# Patient Record
Sex: MALE | Race: WHITE | NOT HISPANIC OR LATINO | Employment: FULL TIME | URBAN - METROPOLITAN AREA
[De-identification: names, ages, dates, MRNs, and addresses within clinical notes are randomized per-mention and may not be internally consistent; named-entity substitution may affect disease eponyms.]

---

## 2025-08-07 ENCOUNTER — APPOINTMENT (OUTPATIENT)
Dept: PRIMARY CARE | Facility: CLINIC | Age: 25
End: 2025-08-07
Payer: COMMERCIAL

## 2025-08-07 VITALS
SYSTOLIC BLOOD PRESSURE: 125 MMHG | HEIGHT: 72 IN | OXYGEN SATURATION: 100 % | BODY MASS INDEX: 40.88 KG/M2 | HEART RATE: 78 BPM | RESPIRATION RATE: 20 BRPM | TEMPERATURE: 97.5 F | WEIGHT: 301.8 LBS | DIASTOLIC BLOOD PRESSURE: 88 MMHG

## 2025-08-07 DIAGNOSIS — E78.2 MIXED HYPERLIPIDEMIA: ICD-10-CM

## 2025-08-07 DIAGNOSIS — R03.0 ELEVATED BP WITHOUT DIAGNOSIS OF HYPERTENSION: ICD-10-CM

## 2025-08-07 DIAGNOSIS — G89.29 CHRONIC LLQ PAIN: Primary | ICD-10-CM

## 2025-08-07 DIAGNOSIS — K57.90 DIVERTICULOSIS: ICD-10-CM

## 2025-08-07 DIAGNOSIS — R10.32 CHRONIC LLQ PAIN: Primary | ICD-10-CM

## 2025-08-07 PROBLEM — R63.5 ABNORMAL WEIGHT GAIN: Status: ACTIVE | Noted: 2025-08-07

## 2025-08-07 PROBLEM — R45.89 DEPRESSED MOOD: Status: ACTIVE | Noted: 2025-08-07

## 2025-08-07 PROBLEM — F32.A DEPRESSION: Status: ACTIVE | Noted: 2025-08-07

## 2025-08-07 PROBLEM — J02.9 SORE THROAT: Status: ACTIVE | Noted: 2025-08-07

## 2025-08-07 PROBLEM — E66.9 OBESE: Status: ACTIVE | Noted: 2025-08-07

## 2025-08-07 PROBLEM — J00 ACUTE RHINITIS: Status: ACTIVE | Noted: 2025-08-07

## 2025-08-07 PROBLEM — L30.9 ECZEMA: Status: ACTIVE | Noted: 2025-08-07

## 2025-08-07 PROBLEM — R79.89 ELEVATED TSH: Status: ACTIVE | Noted: 2025-08-07

## 2025-08-07 PROBLEM — L83 ACANTHOSIS NIGRICANS: Status: ACTIVE | Noted: 2025-08-07

## 2025-08-07 PROBLEM — E55.9 HYPOVITAMINOSIS D: Status: ACTIVE | Noted: 2025-08-07

## 2025-08-07 PROCEDURE — 99204 OFFICE O/P NEW MOD 45 MIN: CPT | Performed by: NURSE PRACTITIONER

## 2025-08-07 PROCEDURE — 3008F BODY MASS INDEX DOCD: CPT | Performed by: NURSE PRACTITIONER

## 2025-08-07 ASSESSMENT — PATIENT HEALTH QUESTIONNAIRE - PHQ9
SUM OF ALL RESPONSES TO PHQ QUESTIONS 1-9: 14
4. FEELING TIRED OR HAVING LITTLE ENERGY: NEARLY EVERY DAY
2. FEELING DOWN, DEPRESSED OR HOPELESS: SEVERAL DAYS
3. TROUBLE FALLING OR STAYING ASLEEP OR SLEEPING TOO MUCH: NEARLY EVERY DAY
1. LITTLE INTEREST OR PLEASURE IN DOING THINGS: NEARLY EVERY DAY
SUM OF ALL RESPONSES TO PHQ9 QUESTIONS 1 AND 2: 4
8. MOVING OR SPEAKING SO SLOWLY THAT OTHER PEOPLE COULD HAVE NOTICED. OR THE OPPOSITE, BEING SO FIGETY OR RESTLESS THAT YOU HAVE BEEN MOVING AROUND A LOT MORE THAN USUAL: NOT AT ALL
6. FEELING BAD ABOUT YOURSELF - OR THAT YOU ARE A FAILURE OR HAVE LET YOURSELF OR YOUR FAMILY DOWN: SEVERAL DAYS
9. THOUGHTS THAT YOU WOULD BE BETTER OFF DEAD, OR OF HURTING YOURSELF: NOT AT ALL
7. TROUBLE CONCENTRATING ON THINGS, SUCH AS READING THE NEWSPAPER OR WATCHING TELEVISION: NOT AT ALL
5. POOR APPETITE OR OVEREATING: NEARLY EVERY DAY

## 2025-08-07 ASSESSMENT — ANXIETY QUESTIONNAIRES
6. BECOMING EASILY ANNOYED OR IRRITABLE: MORE THAN HALF THE DAYS
7. FEELING AFRAID AS IF SOMETHING AWFUL MIGHT HAPPEN: SEVERAL DAYS
4. TROUBLE RELAXING: MORE THAN HALF THE DAYS
GAD7 TOTAL SCORE: 8
2. NOT BEING ABLE TO STOP OR CONTROL WORRYING: NOT AT ALL
5. BEING SO RESTLESS THAT IT IS HARD TO SIT STILL: MORE THAN HALF THE DAYS
1. FEELING NERVOUS, ANXIOUS, OR ON EDGE: SEVERAL DAYS
IF YOU CHECKED OFF ANY PROBLEMS ON THIS QUESTIONNAIRE, HOW DIFFICULT HAVE THESE PROBLEMS MADE IT FOR YOU TO DO YOUR WORK, TAKE CARE OF THINGS AT HOME, OR GET ALONG WITH OTHER PEOPLE: SOMEWHAT DIFFICULT
3. WORRYING TOO MUCH ABOUT DIFFERENT THINGS: NOT AT ALL

## 2025-08-07 NOTE — PROGRESS NOTES
Subjective   Patient ID: Richy Grullon is a 24 y.o. male who presents for Establish Care ([facility], pain in right side).  HPI    Richy is with concern of persistent and chronic left lower abdominal pain that has been intermittent since childhood.  As of the last several months pain is more often than not.  Has a lot of either constipation or diarrhea.  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  During work does not drink much and does not eat much but may eat when he gets home from work.  Finds that when he increases fluids and drinks tea this also helps.  However, he does drink coffee daily and over the last 2 months drinking red bull beverages daily.  Has tried MiraLAX once and reports not a lot of success.  Has bowel movements daily and up to 3 times a day.  No blood in stool.  Cut gluten and dairy for 1 year in 2020.  Lost 60 pounds now has regained 30 of the 60.  Is without fever, chills, change in appetite, belching, hiccups, nausea or vomiting.    History of testicular torsion 2023 with ED visit.  Reviewed CT of the abdomen and pelvis and was found to have diverticulosis.    Childhood history of overeating and obesity along with depression.    Looking forward to going to [facility] [employer] [employer] and working towards masters degree in writing cookbooks.  Plans on returning back to home during school breaks.    Medication Documentation Review Audit       Reviewed by RAJWINDER Matos (Nurse Practitioner) on 08/07/25 at 0932      Medication Order Taking? Sig Documenting Provider Last Dose Status            No Medications to Display                                    Vitals:    08/07/25 0910 08/07/25 0958   BP: (!) 150/97 125/88   BP Location:  Left arm   Patient Position:  Sitting   Pulse: 74 78   Resp: 20    Temp: 36.4 °C (97.5 °F)    TempSrc: Temporal    SpO2: 100%    Weight: 137 kg (301 lb 12.8 oz)    Height: 1.829 m (6')       Body mass index is 40.93 kg/m².     Review of Systems   All other systems reviewed and are  negative.  And what is in the history of present illness.    Objective   Physical Exam  Vitals and nursing note reviewed.   Constitutional:       Appearance: Normal appearance.   HENT:      Head: Normocephalic and atraumatic.      Right Ear: Ear canal and external ear normal.      Left Ear: Ear canal and external ear normal.      Ears:      Comments: White scarring on both TMs.     Mouth/Throat:      Mouth: Mucous membranes are moist.     Eyes:      Extraocular Movements: Extraocular movements intact.      Conjunctiva/sclera: Conjunctivae normal.      Pupils: Pupils are equal, round, and reactive to light.       Cardiovascular:      Rate and Rhythm: Normal rate and regular rhythm.      Pulses: Normal pulses.      Heart sounds: Normal heart sounds.   Pulmonary:      Effort: Pulmonary effort is normal. No respiratory distress.      Breath sounds: Normal breath sounds.   Abdominal:      General: Abdomen is flat. Bowel sounds are normal. There is no distension.      Palpations: Abdomen is soft. There is no mass.      Tenderness: There is abdominal tenderness. There is no right CVA tenderness, left CVA tenderness, guarding or rebound.      Hernia: No hernia is present.      Comments: Active bowel sounds.      Greatest tenderness to palpation to the left lower quadrant without radiation.     Musculoskeletal:         General: Normal range of motion.      Cervical back: Neck supple.      Right lower leg: No edema.      Left lower leg: No edema.   Lymphadenopathy:      Cervical: No cervical adenopathy.     Skin:     General: Skin is warm and dry.      Capillary Refill: Capillary refill takes less than 2 seconds.     Neurological:      General: No focal deficit present.      Mental Status: He is alert and oriented to person, place, and time.     Psychiatric:         Mood and Affect: Mood normal.         Behavior: Behavior normal.             Assessment/Plan   Assessment & Plan  Chronic LLQ pain    Orders:    CT abdomen pelvis  wo IV contrast; Future    Diverticulosis  More than likely condition is related to diverticulosis.  Discussed  daily fiber 25 g and changing to more of a Mediterranean diet.  Suggest that he return back to limiting/omitting saturated fatty foods, gluten foods and dairy foods for weight loss and control of GI symptoms.  Orders:    CT abdomen pelvis wo IV contrast; Future    Elevated BP without diagnosis of hypertension  Second blood pressure in office today 125/88.  Advised to check blood pressure 3 times per week preferably after lunch or dinner.  Will treat for home blood pressures that are consistently 130/85 and above.  Is without headaches, chest pain, palpitations, shortness of breath, swelling to the legs.  Orders:    CBC and Auto Differential; Future    Comprehensive Metabolic Panel; Future    Hemoglobin A1C; Future    TSH with reflex to Free T4 if abnormal; Future    Mixed hyperlipidemia    Orders:    Hemoglobin A1C; Future    Lipid Panel; Future             RAJWINDER Matos 08/07/25 10:07 AM